# Patient Record
Sex: FEMALE | Race: WHITE | Employment: FULL TIME | URBAN - NONMETROPOLITAN AREA
[De-identification: names, ages, dates, MRNs, and addresses within clinical notes are randomized per-mention and may not be internally consistent; named-entity substitution may affect disease eponyms.]

---

## 2021-02-11 ENCOUNTER — TELEPHONE (OUTPATIENT)
Dept: SURGERY | Age: 66
End: 2021-02-11

## 2021-02-24 ENCOUNTER — OFFICE VISIT (OUTPATIENT)
Dept: SURGERY | Age: 66
End: 2021-02-24
Payer: COMMERCIAL

## 2021-02-24 VITALS
BODY MASS INDEX: 20 KG/M2 | HEIGHT: 68 IN | HEART RATE: 80 BPM | SYSTOLIC BLOOD PRESSURE: 100 MMHG | WEIGHT: 132 LBS | DIASTOLIC BLOOD PRESSURE: 68 MMHG | RESPIRATION RATE: 18 BRPM | OXYGEN SATURATION: 97 % | TEMPERATURE: 97.3 F

## 2021-02-24 DIAGNOSIS — Z01.818 PRE-OP TESTING: ICD-10-CM

## 2021-02-24 DIAGNOSIS — K82.8 BILIARY DYSKINESIA: Primary | ICD-10-CM

## 2021-02-24 PROCEDURE — 99204 OFFICE O/P NEW MOD 45 MIN: CPT | Performed by: SURGERY

## 2021-02-24 SDOH — HEALTH STABILITY: MENTAL HEALTH: HOW MANY STANDARD DRINKS CONTAINING ALCOHOL DO YOU HAVE ON A TYPICAL DAY?: NOT ASKED

## 2021-02-24 SDOH — HEALTH STABILITY: MENTAL HEALTH: HOW OFTEN DO YOU HAVE A DRINK CONTAINING ALCOHOL?: NOT ASKED

## 2021-02-25 ENCOUNTER — PREP FOR PROCEDURE (OUTPATIENT)
Dept: SURGERY | Age: 66
End: 2021-02-25

## 2021-02-25 RX ORDER — SODIUM CHLORIDE 9 MG/ML
INJECTION, SOLUTION INTRAVENOUS CONTINUOUS
Status: CANCELLED | OUTPATIENT
Start: 2021-02-25

## 2021-02-25 RX ORDER — INDOCYANINE GREEN AND WATER 25 MG
5 KIT INJECTION ONCE
Status: CANCELLED | OUTPATIENT
Start: 2021-02-25 | End: 2021-02-25

## 2021-02-25 ASSESSMENT — ENCOUNTER SYMPTOMS
TROUBLE SWALLOWING: 0
FACIAL SWELLING: 0
EYE ITCHING: 0
BACK PAIN: 0
DIARRHEA: 1
SORE THROAT: 0
CHOKING: 0
RECTAL PAIN: 0
EYE PAIN: 0
CHEST TIGHTNESS: 0
ANAL BLEEDING: 0
VOMITING: 0
VOICE CHANGE: 0
NAUSEA: 0
BLOOD IN STOOL: 0
RHINORRHEA: 0
ABDOMINAL DISTENTION: 0
WHEEZING: 0
COLOR CHANGE: 0
COUGH: 0
EYE REDNESS: 0
APNEA: 0
EYE DISCHARGE: 0
SINUS PRESSURE: 0
SHORTNESS OF BREATH: 0
STRIDOR: 0
PHOTOPHOBIA: 0
ALLERGIC/IMMUNOLOGIC NEGATIVE: 1
ABDOMINAL PAIN: 0
CONSTIPATION: 0

## 2021-02-25 NOTE — PROGRESS NOTES
Eva Gonzalez (:  1955)     ASSESSMENT:  1. Biliary dyskinesia  2. Diarrhea    PLAN:  1. Schedule Elise for laparoscopic cholecystectomy. 2. She will undergo pre-operative clearance per anesthesia guidelines with risk factors listed under the past medical history diagnosis & problem list.  3. The risks, benefits and alternatives were discussed with Thomas Ohara including non-operative management. The pros and cons of robotic, laparoscopic and open techniques were discussed. All questions answered. She understands and wishes to proceed with surgical intervention. 4. Restrictions discussed with Thomas Ohara and she expresses understanding. 5. She is advised to call back directly if there are further questions/concerns, or if her symptoms worsen prior to surgery. --Discussed with patient and  in regards to some concern as only major symptom is diarrhea. They seem to be aware and still wished to proceed with cholecystectomy. SUBJECTIVE/OBJECTIVE:    Chief Complaint   Patient presents with    Surgical Consult     New patient-referred by Dr Tra mckay-Low EF on HIDA scan     HPI  Thomas Ohara is a 82-year-old female who presents for evaluation due to biliary dyskinesia. HIDA scan demonstrated ejection fraction 23%. She has been having worsening diarrhea after food intake. Also associated with the HIDA scan. No hematochezia or melena. She has drastically decreased her diet and change food intake with marginal success. Has had some weight loss because of it. Denies much upper abdominal discomfort. No real abdominal pain. Minimal to no nausea. No emesis. No generalized abdominal pain. No chest pain or shortness of breath. No fever, chills or sweats. No change with urination. Has seen Dr. Aletha Gallardo and underwent colonoscopy without any significant findings. She states her ultrasound did not demonstrate any significant gallbladder stones.   Concerned it is still gallbladder related given HIDA scan and persistent loose stools with food intake. She is changed diet with only marginal success. Denies any significant food allergies. Review of Systems   Constitutional: Positive for unexpected weight change. Negative for activity change, appetite change, chills, diaphoresis, fatigue and fever. HENT: Negative for congestion, dental problem, drooling, ear discharge, ear pain, facial swelling, hearing loss, mouth sores, nosebleeds, postnasal drip, rhinorrhea, sinus pressure, sneezing, sore throat, tinnitus, trouble swallowing and voice change. Eyes: Negative for photophobia, pain, discharge, redness, itching and visual disturbance. Respiratory: Negative for apnea, cough, choking, chest tightness, shortness of breath, wheezing and stridor. Cardiovascular: Negative for chest pain, palpitations and leg swelling. Gastrointestinal: Positive for diarrhea. Negative for abdominal distention, abdominal pain, anal bleeding, blood in stool, constipation, nausea, rectal pain and vomiting. Endocrine: Negative. Genitourinary: Negative for decreased urine volume, difficulty urinating, dyspareunia, dysuria, enuresis, flank pain, frequency, genital sores, hematuria, menstrual problem, pelvic pain, urgency, vaginal bleeding, vaginal discharge and vaginal pain. Musculoskeletal: Negative for arthralgias, back pain, gait problem, joint swelling, myalgias, neck pain and neck stiffness. Skin: Negative for color change, pallor, rash and wound. Allergic/Immunologic: Negative. Neurological: Negative for dizziness, tremors, seizures, syncope, facial asymmetry, speech difficulty, weakness, light-headedness, numbness and headaches. Hematological: Negative for adenopathy. Does not bruise/bleed easily. Psychiatric/Behavioral: Negative for agitation, behavioral problems, confusion, decreased concentration, dysphoric mood, hallucinations, self-injury, sleep disturbance and suicidal ideas.  The patient is not nervous/anxious and is not hyperactive. History reviewed. No pertinent past medical history. Past Surgical History:   Procedure Laterality Date     SECTION      COLONOSCOPY  2020    Dr. Recio Leader       No current outpatient medications on file. No current facility-administered medications for this visit.         No Known Allergies    Family History   Problem Relation Age of Onset    Other Mother         AAA       Social History     Socioeconomic History    Marital status:      Spouse name: Not on file    Number of children: Not on file    Years of education: Not on file    Highest education level: Not on file   Occupational History    Not on file   Social Needs    Financial resource strain: Not on file    Food insecurity     Worry: Not on file     Inability: Not on file    Transportation needs     Medical: Not on file     Non-medical: Not on file   Tobacco Use    Smoking status: Never Smoker    Smokeless tobacco: Never Used   Substance and Sexual Activity    Alcohol use: Not Currently     Comment: social    Drug use: Never    Sexual activity: Not on file   Lifestyle    Physical activity     Days per week: Not on file     Minutes per session: Not on file    Stress: Not on file   Relationships    Social connections     Talks on phone: Not on file     Gets together: Not on file     Attends Episcopal service: Not on file     Active member of club or organization: Not on file     Attends meetings of clubs or organizations: Not on file     Relationship status: Not on file    Intimate partner violence     Fear of current or ex partner: Not on file     Emotionally abused: Not on file     Physically abused: Not on file     Forced sexual activity: Not on file   Other Topics Concern    Not on file   Social History Narrative    Not on file     Vitals:    21 1410   BP: 100/68   Site: Left Upper Arm   Position: Sitting   Cuff Size: Medium Adult   Pulse: 80 used to authenticate this note.     --Glendy Gill MD

## 2021-02-28 NOTE — H&P
Liset Begum (: 1955)   ASSESSMENT:   1. Biliary dyskinesia   2. Diarrhea   PLAN:   1. Schedule Elise for laparoscopic cholecystectomy. 2. She will undergo pre-operative clearance per anesthesia guidelines with risk factors listed under the past medical history diagnosis & problem list.   3. The risks, benefits and alternatives were discussed with Boni Almeida including non-operative management. The pros and cons of robotic, laparoscopic and open techniques were discussed. All questions answered. She understands and wishes to proceed with surgical intervention. 4. Restrictions discussed with Boni Almeida and she expresses understanding. 5. She is advised to call back directly if there are further questions/concerns, or if her symptoms worsen prior to surgery. --Discussed with patient and  in regards to some concern as only major symptom is diarrhea. They seem to be aware and still wished to proceed with cholecystectomy. SUBJECTIVE/OBJECTIVE:        Chief Complaint   Patient presents with    Surgical Consult     New patient-referred by Dr Derek Barcenas dyskinesia-Low EF on HIDA scan     HPI   Boni Almeida is a 68-year-old female who presents for evaluation due to biliary dyskinesia. HIDA scan demonstrated ejection fraction 23%. She has been having worsening diarrhea after food intake. Also associated with the HIDA scan. No hematochezia or melena. She has drastically decreased her diet and change food intake with marginal success. Has had some weight loss because of it. Denies much upper abdominal discomfort. No real abdominal pain. Minimal to no nausea. No emesis. No generalized abdominal pain. No chest pain or shortness of breath. No fever, chills or sweats. No change with urination. Has seen Dr. Niko Gordon and underwent colonoscopy without any significant findings. She states her ultrasound did not demonstrate any significant gallbladder stones.  Concerned it is still gallbladder related given HIDA scan and persistent loose stools with food intake. She is changed diet with only marginal success. Denies any significant food allergies. Review of Systems   Constitutional: Positive for unexpected weight change. Negative for activity change, appetite change, chills, diaphoresis, fatigue and fever. HENT: Negative for congestion, dental problem, drooling, ear discharge, ear pain, facial swelling, hearing loss, mouth sores, nosebleeds, postnasal drip, rhinorrhea, sinus pressure, sneezing, sore throat, tinnitus, trouble swallowing and voice change. Eyes: Negative for photophobia, pain, discharge, redness, itching and visual disturbance. Respiratory: Negative for apnea, cough, choking, chest tightness, shortness of breath, wheezing and stridor. Cardiovascular: Negative for chest pain, palpitations and leg swelling. Gastrointestinal: Positive for diarrhea. Negative for abdominal distention, abdominal pain, anal bleeding, blood in stool, constipation, nausea, rectal pain and vomiting. Endocrine: Negative. Genitourinary: Negative for decreased urine volume, difficulty urinating, dyspareunia, dysuria, enuresis, flank pain, frequency, genital sores, hematuria, menstrual problem, pelvic pain, urgency, vaginal bleeding, vaginal discharge and vaginal pain. Musculoskeletal: Negative for arthralgias, back pain, gait problem, joint swelling, myalgias, neck pain and neck stiffness. Skin: Negative for color change, pallor, rash and wound. Allergic/Immunologic: Negative. Neurological: Negative for dizziness, tremors, seizures, syncope, facial asymmetry, speech difficulty, weakness, light-headedness, numbness and headaches. Hematological: Negative for adenopathy. Does not bruise/bleed easily. Psychiatric/Behavioral: Negative for agitation, behavioral problems, confusion, decreased concentration, dysphoric mood, hallucinations, self-injury, sleep disturbance and suicidal ideas.  The patient is not nervous/anxious and is not hyperactive. Past Medical History   History reviewed. No pertinent past medical history. Past Surgical History         Past Surgical History:   Procedure Laterality Date     SECTION      COLONOSCOPY  2020    Dr. Malick Perdomo     Current Facility-Administered Medications   No current outpatient medications on file. No current facility-administered medications for this visit.     No Known Allergies   Family History         Family History   Problem Relation Age of Onset    Other Mother     AAA     Social History         Socioeconomic History    Marital status:      Spouse name: Not on file    Number of children: Not on file    Years of education: Not on file    Highest education level: Not on file   Occupational History    Not on file   Social Needs    Financial resource strain: Not on file    Food insecurity     Worry: Not on file     Inability: Not on file    Transportation needs     Medical: Not on file     Non-medical: Not on file   Tobacco Use    Smoking status: Never Smoker    Smokeless tobacco: Never Used   Substance and Sexual Activity    Alcohol use: Not Currently     Comment: social    Drug use: Never    Sexual activity: Not on file   Lifestyle    Physical activity     Days per week: Not on file     Minutes per session: Not on file    Stress: Not on file   Relationships    Social connections     Talks on phone: Not on file     Gets together: Not on file     Attends Scientologist service: Not on file     Active member of club or organization: Not on file     Attends meetings of clubs or organizations: Not on file     Relationship status: Not on file    Intimate partner violence     Fear of current or ex partner: Not on file     Emotionally abused: Not on file     Physically abused: Not on file     Forced sexual activity: Not on file   Other Topics Concern    Not on file   Social History Narrative    Not on file     Vitals       Vitals: 02/24/21 1410   BP: 100/68   Site: Left Upper Arm   Position: Sitting   Cuff Size: Medium Adult   Pulse: 80   Resp: 18   Temp: 97.3 °F (36.3 °C)   TempSrc: Temporal   SpO2: 97%   Weight: 132 lb (59.9 kg)   Height: 5' 8\" (1.727 m)   Body mass index is 20.07 kg/m². Wt Readings from Last 3 Encounters:   02/24/21 132 lb (59.9 kg)     Physical Exam   Vitals signs reviewed. Constitutional:   General: She is not in acute distress. Appearance: She is well-developed. She is not diaphoretic. HENT:   Head: Normocephalic and atraumatic. Right Ear: External ear normal.   Left Ear: External ear normal.   Nose: Nose normal.   Eyes:   General: No scleral icterus. Right eye: No discharge. Left eye: No discharge. Conjunctiva/sclera: Conjunctivae normal.   Neck:   Musculoskeletal: Normal range of motion and neck supple. Cardiovascular:   Rate and Rhythm: Normal rate and regular rhythm. Heart sounds: Normal heart sounds. Pulmonary:   Effort: Pulmonary effort is normal. No respiratory distress. Breath sounds: Normal breath sounds. No wheezing or rales. Chest:   Chest wall: No tenderness. Abdominal:   General: Bowel sounds are normal. There is no distension. Palpations: Abdomen is soft. There is no mass. Tenderness: There is no abdominal tenderness. There is no guarding or rebound. Musculoskeletal: Normal range of motion. General: No tenderness. Skin:   General: Skin is warm and dry. Coloration: Skin is not pale. Findings: No erythema or rash. Neurological:   Mental Status: She is alert and oriented to person, place, and time. Cranial Nerves: No cranial nerve deficit. Psychiatric:   Behavior: Behavior normal.   Thought Content: Thought content normal.   Judgment: Judgment normal.     No results found for: NA, K, CL, CO2   No results found for: CREATININE   No results found for: WBC, HGB, HCT, MCV, PLT         There is no problem list on file for this patient.      An electronic signature was used to authenticate this note.    --Belkis Lynn MD

## 2021-03-04 ENCOUNTER — ANESTHESIA EVENT (OUTPATIENT)
Dept: OPERATING ROOM | Age: 66
End: 2021-03-04
Payer: COMMERCIAL

## 2021-03-04 ENCOUNTER — HOSPITAL ENCOUNTER (OUTPATIENT)
Age: 66
Setting detail: OUTPATIENT SURGERY
Discharge: HOME | End: 2021-03-04
Attending: SURGERY | Admitting: SURGERY
Payer: COMMERCIAL

## 2021-03-04 ENCOUNTER — ANESTHESIA (OUTPATIENT)
Dept: OPERATING ROOM | Age: 66
End: 2021-03-04
Payer: COMMERCIAL

## 2021-03-04 VITALS — SYSTOLIC BLOOD PRESSURE: 106 MMHG | TEMPERATURE: 98.2 F | DIASTOLIC BLOOD PRESSURE: 51 MMHG | OXYGEN SATURATION: 97 %

## 2021-03-04 VITALS
HEART RATE: 60 BPM | RESPIRATION RATE: 16 BRPM | TEMPERATURE: 96.7 F | SYSTOLIC BLOOD PRESSURE: 102 MMHG | HEIGHT: 68 IN | DIASTOLIC BLOOD PRESSURE: 59 MMHG | WEIGHT: 127.8 LBS | OXYGEN SATURATION: 98 % | BODY MASS INDEX: 19.37 KG/M2

## 2021-03-04 DIAGNOSIS — K42.9 UMBILICAL HERNIA WITHOUT OBSTRUCTION AND WITHOUT GANGRENE: ICD-10-CM

## 2021-03-04 DIAGNOSIS — K82.8 BILIARY DYSKINESIA: Primary | ICD-10-CM

## 2021-03-04 PROCEDURE — 2500000003 HC RX 250 WO HCPCS: Performed by: NURSE ANESTHETIST, CERTIFIED REGISTERED

## 2021-03-04 PROCEDURE — 7100000001 HC PACU RECOVERY - ADDTL 15 MIN: Performed by: SURGERY

## 2021-03-04 PROCEDURE — 7100000011 HC PHASE II RECOVERY - ADDTL 15 MIN: Performed by: SURGERY

## 2021-03-04 PROCEDURE — 2580000003 HC RX 258

## 2021-03-04 PROCEDURE — 6360000002 HC RX W HCPCS

## 2021-03-04 PROCEDURE — 6370000000 HC RX 637 (ALT 250 FOR IP): Performed by: SURGERY

## 2021-03-04 PROCEDURE — 7100000000 HC PACU RECOVERY - FIRST 15 MIN: Performed by: SURGERY

## 2021-03-04 PROCEDURE — 3700000001 HC ADD 15 MINUTES (ANESTHESIA): Performed by: SURGERY

## 2021-03-04 PROCEDURE — 3700000000 HC ANESTHESIA ATTENDED CARE: Performed by: SURGERY

## 2021-03-04 PROCEDURE — 3600000002 HC SURGERY LEVEL 2 BASE: Performed by: SURGERY

## 2021-03-04 PROCEDURE — 7100000010 HC PHASE II RECOVERY - FIRST 15 MIN: Performed by: SURGERY

## 2021-03-04 PROCEDURE — 88304 TISSUE EXAM BY PATHOLOGIST: CPT

## 2021-03-04 PROCEDURE — 2500000003 HC RX 250 WO HCPCS: Performed by: SURGERY

## 2021-03-04 PROCEDURE — 2720000010 HC SURG SUPPLY STERILE: Performed by: SURGERY

## 2021-03-04 PROCEDURE — 47562 LAPAROSCOPIC CHOLECYSTECTOMY: CPT | Performed by: SURGERY

## 2021-03-04 PROCEDURE — 2500000003 HC RX 250 WO HCPCS

## 2021-03-04 PROCEDURE — 6360000002 HC RX W HCPCS: Performed by: NURSE ANESTHETIST, CERTIFIED REGISTERED

## 2021-03-04 PROCEDURE — 3600000012 HC SURGERY LEVEL 2 ADDTL 15MIN: Performed by: SURGERY

## 2021-03-04 PROCEDURE — 2709999900 HC NON-CHARGEABLE SUPPLY: Performed by: SURGERY

## 2021-03-04 RX ORDER — MORPHINE SULFATE 2 MG/ML
2 INJECTION, SOLUTION INTRAMUSCULAR; INTRAVENOUS
Status: DISCONTINUED | OUTPATIENT
Start: 2021-03-04 | End: 2021-03-04 | Stop reason: HOSPADM

## 2021-03-04 RX ORDER — FENTANYL CITRATE 50 UG/ML
50 INJECTION, SOLUTION INTRAMUSCULAR; INTRAVENOUS EVERY 5 MIN PRN
Status: DISCONTINUED | OUTPATIENT
Start: 2021-03-04 | End: 2021-03-04 | Stop reason: HOSPADM

## 2021-03-04 RX ORDER — NEOSTIGMINE METHYLSULFATE 5 MG/5 ML
SYRINGE (ML) INTRAVENOUS PRN
Status: DISCONTINUED | OUTPATIENT
Start: 2021-03-04 | End: 2021-03-04 | Stop reason: SDUPTHER

## 2021-03-04 RX ORDER — MIDAZOLAM HYDROCHLORIDE 1 MG/ML
INJECTION INTRAMUSCULAR; INTRAVENOUS PRN
Status: DISCONTINUED | OUTPATIENT
Start: 2021-03-04 | End: 2021-03-04 | Stop reason: SDUPTHER

## 2021-03-04 RX ORDER — ONDANSETRON 2 MG/ML
INJECTION INTRAMUSCULAR; INTRAVENOUS PRN
Status: DISCONTINUED | OUTPATIENT
Start: 2021-03-04 | End: 2021-03-04 | Stop reason: SDUPTHER

## 2021-03-04 RX ORDER — LIDOCAINE HYDROCHLORIDE 20 MG/ML
INJECTION, SOLUTION INFILTRATION; PERINEURAL PRN
Status: DISCONTINUED | OUTPATIENT
Start: 2021-03-04 | End: 2021-03-04 | Stop reason: SDUPTHER

## 2021-03-04 RX ORDER — PROMETHAZINE HYDROCHLORIDE 25 MG/1
12.5 TABLET ORAL EVERY 6 HOURS PRN
Status: DISCONTINUED | OUTPATIENT
Start: 2021-03-04 | End: 2021-03-04 | Stop reason: HOSPADM

## 2021-03-04 RX ORDER — BUPIVACAINE HYDROCHLORIDE 5 MG/ML
INJECTION, SOLUTION EPIDURAL; INTRACAUDAL PRN
Status: DISCONTINUED | OUTPATIENT
Start: 2021-03-04 | End: 2021-03-04 | Stop reason: ALTCHOICE

## 2021-03-04 RX ORDER — HYDROCODONE BITARTRATE AND ACETAMINOPHEN 5; 325 MG/1; MG/1
1-2 TABLET ORAL EVERY 6 HOURS PRN
Qty: 20 TABLET | Refills: 0 | Status: SHIPPED | OUTPATIENT
Start: 2021-03-04 | End: 2021-03-07

## 2021-03-04 RX ORDER — INDOCYANINE GREEN AND WATER 25 MG
5 KIT INJECTION ONCE
Status: COMPLETED | OUTPATIENT
Start: 2021-03-04 | End: 2021-03-04

## 2021-03-04 RX ORDER — OXYCODONE HYDROCHLORIDE 5 MG/1
10 TABLET ORAL EVERY 4 HOURS PRN
Status: DISCONTINUED | OUTPATIENT
Start: 2021-03-04 | End: 2021-03-04 | Stop reason: HOSPADM

## 2021-03-04 RX ORDER — SODIUM CHLORIDE 9 MG/ML
INJECTION, SOLUTION INTRAVENOUS CONTINUOUS
Status: DISCONTINUED | OUTPATIENT
Start: 2021-03-04 | End: 2021-03-04 | Stop reason: HOSPADM

## 2021-03-04 RX ORDER — GLYCOPYRROLATE 1 MG/5 ML
SYRINGE (ML) INTRAVENOUS PRN
Status: DISCONTINUED | OUTPATIENT
Start: 2021-03-04 | End: 2021-03-04 | Stop reason: SDUPTHER

## 2021-03-04 RX ORDER — LABETALOL 20 MG/4 ML (5 MG/ML) INTRAVENOUS SYRINGE
5 EVERY 10 MIN PRN
Status: DISCONTINUED | OUTPATIENT
Start: 2021-03-04 | End: 2021-03-04 | Stop reason: HOSPADM

## 2021-03-04 RX ORDER — KETOROLAC TROMETHAMINE 10 MG/1
10 TABLET, FILM COATED ORAL EVERY 8 HOURS PRN
Qty: 15 TABLET | Refills: 0 | Status: SHIPPED | OUTPATIENT
Start: 2021-03-04 | End: 2021-03-17 | Stop reason: ALTCHOICE

## 2021-03-04 RX ORDER — ROCURONIUM BROMIDE 10 MG/ML
INJECTION, SOLUTION INTRAVENOUS PRN
Status: DISCONTINUED | OUTPATIENT
Start: 2021-03-04 | End: 2021-03-04 | Stop reason: SDUPTHER

## 2021-03-04 RX ORDER — FENTANYL CITRATE 50 UG/ML
INJECTION, SOLUTION INTRAMUSCULAR; INTRAVENOUS PRN
Status: DISCONTINUED | OUTPATIENT
Start: 2021-03-04 | End: 2021-03-04 | Stop reason: SDUPTHER

## 2021-03-04 RX ORDER — ONDANSETRON 2 MG/ML
4 INJECTION INTRAMUSCULAR; INTRAVENOUS EVERY 6 HOURS PRN
Status: DISCONTINUED | OUTPATIENT
Start: 2021-03-04 | End: 2021-03-04 | Stop reason: HOSPADM

## 2021-03-04 RX ORDER — SODIUM CHLORIDE 0.9 % (FLUSH) 0.9 %
10 SYRINGE (ML) INJECTION PRN
Status: DISCONTINUED | OUTPATIENT
Start: 2021-03-04 | End: 2021-03-04 | Stop reason: HOSPADM

## 2021-03-04 RX ORDER — MORPHINE SULFATE 2 MG/ML
4 INJECTION, SOLUTION INTRAMUSCULAR; INTRAVENOUS
Status: DISCONTINUED | OUTPATIENT
Start: 2021-03-04 | End: 2021-03-04 | Stop reason: HOSPADM

## 2021-03-04 RX ORDER — OXYCODONE HYDROCHLORIDE 5 MG/1
5 TABLET ORAL EVERY 4 HOURS PRN
Status: DISCONTINUED | OUTPATIENT
Start: 2021-03-04 | End: 2021-03-04 | Stop reason: HOSPADM

## 2021-03-04 RX ORDER — MEPERIDINE HYDROCHLORIDE 25 MG/ML
12.5 INJECTION INTRAMUSCULAR; INTRAVENOUS; SUBCUTANEOUS EVERY 5 MIN PRN
Status: DISCONTINUED | OUTPATIENT
Start: 2021-03-04 | End: 2021-03-04 | Stop reason: HOSPADM

## 2021-03-04 RX ORDER — ONDANSETRON 2 MG/ML
4 INJECTION INTRAMUSCULAR; INTRAVENOUS
Status: DISCONTINUED | OUTPATIENT
Start: 2021-03-04 | End: 2021-03-04 | Stop reason: HOSPADM

## 2021-03-04 RX ORDER — DEXAMETHASONE SODIUM PHOSPHATE 4 MG/ML
INJECTION, SOLUTION INTRA-ARTICULAR; INTRALESIONAL; INTRAMUSCULAR; INTRAVENOUS; SOFT TISSUE PRN
Status: DISCONTINUED | OUTPATIENT
Start: 2021-03-04 | End: 2021-03-04 | Stop reason: SDUPTHER

## 2021-03-04 RX ORDER — SODIUM CHLORIDE 0.9 % (FLUSH) 0.9 %
10 SYRINGE (ML) INJECTION EVERY 12 HOURS SCHEDULED
Status: DISCONTINUED | OUTPATIENT
Start: 2021-03-04 | End: 2021-03-04 | Stop reason: HOSPADM

## 2021-03-04 RX ADMIN — SODIUM CHLORIDE: 9 INJECTION, SOLUTION INTRAVENOUS at 09:29

## 2021-03-04 RX ADMIN — PHENYLEPHRINE HYDROCHLORIDE 100 MCG: 10 INJECTION INTRAVENOUS at 11:05

## 2021-03-04 RX ADMIN — MIDAZOLAM HYDROCHLORIDE 1 MG: 1 INJECTION, SOLUTION INTRAMUSCULAR; INTRAVENOUS at 10:41

## 2021-03-04 RX ADMIN — LIDOCAINE HYDROCHLORIDE 80 MG: 20 INJECTION, SOLUTION INFILTRATION; PERINEURAL at 10:41

## 2021-03-04 RX ADMIN — FENTANYL CITRATE 100 MCG: 50 INJECTION, SOLUTION INTRAMUSCULAR; INTRAVENOUS at 10:41

## 2021-03-04 RX ADMIN — PHENYLEPHRINE HYDROCHLORIDE 100 MCG: 10 INJECTION INTRAVENOUS at 10:57

## 2021-03-04 RX ADMIN — ONDANSETRON HYDROCHLORIDE 4 MG: 4 INJECTION, SOLUTION INTRAMUSCULAR; INTRAVENOUS at 10:47

## 2021-03-04 RX ADMIN — FENTANYL CITRATE 50 MCG: 50 INJECTION, SOLUTION INTRAMUSCULAR; INTRAVENOUS at 11:05

## 2021-03-04 RX ADMIN — OXYCODONE 5 MG: 5 TABLET ORAL at 12:56

## 2021-03-04 RX ADMIN — CEFOXITIN 2 G: 2 INJECTION, POWDER, FOR SOLUTION INTRAVENOUS at 10:36

## 2021-03-04 RX ADMIN — PHENYLEPHRINE HYDROCHLORIDE 100 MCG: 10 INJECTION INTRAVENOUS at 11:00

## 2021-03-04 RX ADMIN — Medication 4 MG: at 11:24

## 2021-03-04 RX ADMIN — PROPOFOL 130 MG: 10 INJECTION, EMULSION INTRAVENOUS at 10:41

## 2021-03-04 RX ADMIN — Medication 5 MG: at 09:40

## 2021-03-04 RX ADMIN — ROCURONIUM BROMIDE 30 MG: 10 INJECTION INTRAVENOUS at 10:42

## 2021-03-04 RX ADMIN — DEXAMETHASONE SODIUM PHOSPHATE 10 MG: 4 INJECTION, SOLUTION INTRAMUSCULAR; INTRAVENOUS at 10:47

## 2021-03-04 RX ADMIN — MIDAZOLAM HYDROCHLORIDE 1 MG: 1 INJECTION, SOLUTION INTRAMUSCULAR; INTRAVENOUS at 10:32

## 2021-03-04 RX ADMIN — Medication 0.6 MG: at 11:24

## 2021-03-04 RX ADMIN — PHENYLEPHRINE HYDROCHLORIDE 100 MCG: 10 INJECTION INTRAVENOUS at 11:03

## 2021-03-04 ASSESSMENT — PAIN SCALES - GENERAL
PAINLEVEL_OUTOF10: 0
PAINLEVEL_OUTOF10: 0
PAINLEVEL_OUTOF10: 2
PAINLEVEL_OUTOF10: 0
PAINLEVEL_OUTOF10: 0
PAINLEVEL_OUTOF10: 3

## 2021-03-04 ASSESSMENT — PULMONARY FUNCTION TESTS
PIF_VALUE: 15
PIF_VALUE: 13
PIF_VALUE: 7
PIF_VALUE: 2
PIF_VALUE: 16
PIF_VALUE: 12
PIF_VALUE: 17
PIF_VALUE: 2
PIF_VALUE: 2
PIF_VALUE: 7
PIF_VALUE: 16
PIF_VALUE: 13
PIF_VALUE: 19
PIF_VALUE: 1
PIF_VALUE: 2
PIF_VALUE: 16
PIF_VALUE: 2
PIF_VALUE: 17
PIF_VALUE: 12
PIF_VALUE: 7
PIF_VALUE: 11
PIF_VALUE: 4
PIF_VALUE: 8
PIF_VALUE: 8
PIF_VALUE: 2
PIF_VALUE: 12
PIF_VALUE: 10
PIF_VALUE: 2
PIF_VALUE: 17
PIF_VALUE: 16
PIF_VALUE: 1
PIF_VALUE: 8

## 2021-03-04 ASSESSMENT — PAIN - FUNCTIONAL ASSESSMENT: PAIN_FUNCTIONAL_ASSESSMENT: 0-10

## 2021-03-04 NOTE — INTERVAL H&P NOTE
Update History & Physical    The patient's History and Physical was reviewed with the patient and I examined the patient. There was no change. The surgical site was confirmed by the patient and me. Plan: The risks, benefits, expected outcome, and alternative to the recommended procedure have been discussed with the patient. Patient understands and wants to proceed with the procedure. The patient was counseled at length about the risks of hamzah Covid-19 during their perioperative period and any recovery window from their procedure. The patient was made aware that hamzah Covid-19  may worsen their prognosis for recovering from their procedure  and lend to a higher morbidity and/or mortality risk. All material risks, benefits, and reasonable alternatives including postponing the procedure were discussed. The patient does wish to proceed with the procedure at this time.     Electronically signed by Amalia Morrison MD on 3/4/2021 at 10:03 AM

## 2021-03-04 NOTE — ANESTHESIA PRE PROCEDURE
Department of Anesthesiology  Preprocedure Note       Name:  Jessica Morales   Age:  63 y.o.  :  1955                                          MRN:  [id number]         Date:  3/4/2021      Surgeon: Suzette Moya):  Marvin Boateng MD    Procedure: Procedure(s):  CHOLECYSTECTOMY LAPAROSCOPIC    Medications prior to admission:   Prior to Admission medications    Not on File       Current medications:    Current Facility-Administered Medications   Medication Dose Route Frequency Provider Last Rate Last Admin    0.9 % sodium chloride infusion   Intravenous Continuous Crystal Camper, LPN        cefOXitin (MEFOXIN) 2000 mg in dextrose 5% 50 mL (mini-bag)  2,000 mg Intravenous Once Crystal Camper, LPN        indocyanine green (IC-GREEN) syringe 5 mg  5 mg Intravenous Once Crystal Camper, LPN           Allergies:  No Known Allergies    Problem List:  There is no problem list on file for this patient. Past Medical History:  History reviewed. No pertinent past medical history.     Past Surgical History:        Procedure Laterality Date     SECTION      COLONOSCOPY  2020    Dr. Devi Del Toro       Social History:    Social History     Tobacco Use    Smoking status: Never Smoker    Smokeless tobacco: Never Used   Substance Use Topics    Alcohol use: Not Currently     Comment: social                                Counseling given: Not Answered      Vital Signs (Current):   Vitals:    21 0852   BP: 114/67   Pulse: 73   Resp: 16   Temp: 97.5 °F (36.4 °C)   TempSrc: Temporal   SpO2: 99%   Weight: 127 lb 12.8 oz (58 kg)   Height: 5' 8\" (1.727 m)                                              BP Readings from Last 3 Encounters:   21 114/67   21 100/68       NPO Status: Time of last liquid consumption:                         Time of last solid consumption:                         Date of last liquid consumption: 21

## 2021-03-04 NOTE — ANESTHESIA POSTPROCEDURE EVALUATION
Post-op Pain:  Adequate analgesia    Post-op Assessment:  No apparent anesthetic complications    Additional Follow-Up / Treatment / Comment:  None    Lewis Borges.  DO Pinky  March 4, 2021   2:58 PM

## 2021-03-05 ENCOUNTER — TELEPHONE (OUTPATIENT)
Dept: SURGERY | Age: 66
End: 2021-03-05

## 2021-03-05 NOTE — TELEPHONE ENCOUNTER
S/p 3/4 1. Laparoscopic cholecystectomy. 2.  Repair of incarcerated umbilical hernia. Pt states incisions are clean, dry and intact- denies any excessive bleeding/drainage. Pt states that she is up and moving around, and using the bathroom with no issues. Pt denies any pain this morning. Advised to call the office with any questions or concerns.

## 2021-03-16 NOTE — PROGRESS NOTES
[address] [place] [address] [address] [facility] [address]  Dept: [phone]  Dept Fax: [phone]  Loc: [phone]    Visit Date: 3/17/2021    Loma Apley is a 63 y.o. female who presents today for:  Chief Complaint   Patient presents with    Post-Op Check     s/p Laparoscopic cholecystectomy,[id number]       HPI:     HPI    Ryan Hooper is a 66-year old female patient who presents today for follow-up status post laparoscopic cholecystectomy and primary repair of umbilical hernia 2 weeks ago. Pathology demonstrated chronic cholecystitis. Patient states she feels so much better. No further diarrhea since surgery. Patient is off narcotics. Still has some incisional soreness - mainly umbilical as expected. Tolerating regular diet without any nausea or vomiting. Denies any issues with constipation or diarrhea. Incisions are healing well without any signs of infection. No fevers or chills. Urinating without difficulties. Denies any shortness of breath or chest pain. Tolerating increased activity well. Past Medical History:   Diagnosis Date    Biliary dyskinesia     COVID-19     Umbilical hernia without obstruction and without gangrene       Past Surgical History:   Procedure Laterality Date     SECTION      CHOLECYSTECTOMY, LAPAROSCOPIC N/A 3/4/2021    CHOLECYSTECTOMY LAPAROSCOPIC with umbilical hernia repair performed by Belkis Lynn MD at [place] [address]  2020    Dr. Jaison Grubbs       Family History   Problem Relation Age of Onset    Other Mother         AAA       Social History     Tobacco Use    Smoking status: Never Smoker    Smokeless tobacco: Never Used   Substance Use Topics    Alcohol use: Not Currently     Comment: social      No current outpatient medications on file. No current facility-administered medications for this visit.       No Known Allergies    Subjective:     Review of Systems   Constitutional: Negative for activity change, appetite change, chills, diaphoresis, fatigue, fever and unexpected weight change. HENT: Negative for congestion, dental problem, hearing loss, rhinorrhea, sinus pressure and sore throat. Eyes: Negative for photophobia, pain, discharge, itching and visual disturbance. Respiratory: Negative for apnea, cough, choking, chest tightness, shortness of breath and wheezing. Cardiovascular: Negative for chest pain, palpitations and leg swelling. Gastrointestinal: Negative for abdominal distention, abdominal pain, anal bleeding, blood in stool, constipation, diarrhea, nausea and vomiting. Endocrine: Negative. Genitourinary: Negative for decreased urine volume, difficulty urinating, dysuria, frequency and urgency. Musculoskeletal: Negative for arthralgias, back pain, gait problem, joint swelling, myalgias and neck pain. Skin: Negative for color change, pallor, rash and wound. Allergic/Immunologic: Negative. Neurological: Negative for dizziness, tremors, weakness, numbness and headaches. Hematological: Negative. Psychiatric/Behavioral: Negative. Objective:   /60 (Site: Right Upper Arm, Position: Sitting, Cuff Size: Medium Adult)   Pulse 62   Temp 96.2 °F (35.7 °C) (Temporal)   Resp 18   Ht 5' 8\" (1.727 m)   Wt 129 lb 8 oz (58.7 kg)   SpO2 97%   BMI 19.69 kg/m²     Physical Exam  Vitals signs reviewed. Constitutional:       General: She is not in acute distress. Appearance: Normal appearance. She is well-developed. She is not ill-appearing or toxic-appearing. HENT:      Head: Normocephalic and atraumatic. Right Ear: Hearing and external ear normal.      Left Ear: Hearing and external ear normal.      Nose: Nose normal.      Mouth/Throat:      Mouth: Mucous membranes are not pale, not dry and not cyanotic.    Eyes:      General: Lids are normal.   Neck:      Musculoskeletal: Normal range of motion and neck supple. Trachea: Trachea and phonation normal.   Cardiovascular:      Rate and Rhythm: Normal rate and regular rhythm. Pulses: Normal pulses. Heart sounds: S1 normal and S2 normal.   Pulmonary:      Effort: Pulmonary effort is normal. No tachypnea, bradypnea, accessory muscle usage or respiratory distress. Breath sounds: Normal breath sounds. No decreased breath sounds, wheezing or rales. Chest:      Chest wall: No tenderness. Abdominal:      General: Bowel sounds are normal. There is no distension. Palpations: Abdomen is soft. There is no mass. Tenderness: There is no abdominal tenderness. Musculoskeletal: Normal range of motion. General: No tenderness. Skin:     General: Skin is warm and dry. Findings: No abrasion, bruising, burn, ecchymosis, erythema, laceration, lesion or rash. Neurological:      Mental Status: She is alert and oriented to person, place, and time. Motor: No tremor, atrophy or abnormal muscle tone. Coordination: Coordination normal.      Gait: Gait normal.      Deep Tendon Reflexes: Reflexes are normal and symmetric. Psychiatric:         Speech: Speech normal.         Behavior: Behavior normal.         Thought Content: Thought content normal.       PATHOLOGY REPORT   Clinical Information: BILIARY DYSKINESIA     FINAL DIAGNOSIS:   Gallbladder, cholecystectomy:    Chronic cholecystitis. Specimen:   GALLBLADDER     Gross Examination:   The container is labeled sanna Bhardwaj.  Received in   formalin is a gallbladder measuring 6.5 cm in length x 2.5 cm in   diameter.  The surface is smooth and glistening.  There is vascular   congestion.  The wall is intact.  No lymph nodes are identified.  The   lumen contains green bile.  There are no stones.  The wall measures 0.1   cm in thickness and the mucosal surface is bile-stained and velvety.    The cystic duct is patent.  Representative

## 2021-03-17 ENCOUNTER — OFFICE VISIT (OUTPATIENT)
Dept: SURGERY | Age: 66
End: 2021-03-17

## 2021-03-17 VITALS
HEART RATE: 62 BPM | RESPIRATION RATE: 18 BRPM | HEIGHT: 68 IN | BODY MASS INDEX: 19.63 KG/M2 | OXYGEN SATURATION: 97 % | DIASTOLIC BLOOD PRESSURE: 60 MMHG | TEMPERATURE: 96.2 F | WEIGHT: 129.5 LBS | SYSTOLIC BLOOD PRESSURE: 138 MMHG

## 2021-03-17 DIAGNOSIS — Z09 S/P UMBILICAL HERNIA REPAIR, FOLLOW-UP EXAM: ICD-10-CM

## 2021-03-17 DIAGNOSIS — Z90.49 S/P LAPAROSCOPIC CHOLECYSTECTOMY: ICD-10-CM

## 2021-03-17 PROCEDURE — 99024 POSTOP FOLLOW-UP VISIT: CPT | Performed by: NURSE PRACTITIONER

## 2021-03-17 ASSESSMENT — ENCOUNTER SYMPTOMS
SHORTNESS OF BREATH: 0
CHOKING: 0
SORE THROAT: 0
VOMITING: 0
EYE DISCHARGE: 0
BACK PAIN: 0
SINUS PRESSURE: 0
RHINORRHEA: 0
PHOTOPHOBIA: 0
DIARRHEA: 0
CONSTIPATION: 0
ABDOMINAL DISTENTION: 0
ALLERGIC/IMMUNOLOGIC NEGATIVE: 1
WHEEZING: 0
BLOOD IN STOOL: 0
EYE ITCHING: 0
APNEA: 0
CHEST TIGHTNESS: 0
COUGH: 0
NAUSEA: 0
EYE PAIN: 0
ABDOMINAL PAIN: 0
ANAL BLEEDING: 0
COLOR CHANGE: 0

## 2021-03-17 NOTE — LETTER
[address] [facility]  [place] [id number] [facility] [address]  Phone: [phone]  Fax: [phone]    ALESSANDRA Adams CNP        March 17, 2021     Patient: Randal Rubio   YOB: 1955   Date of Visit: 3/17/2021       To Whom It May Concern: It is my medical opinion that Samantha Odonnell may return to work without restrictions on 3/29/21. If you have any questions or concerns, please don't hesitate to call.     Sincerely,        ALESSANDRA Adams CNP Refill request Adderall and hydrocortisone  Last seen 09/27/19  Last filled 09/27/19 and 07/02/19    Allergies and pharmacy are verified.

## 2021-04-16 PROBLEM — Z09 S/P UMBILICAL HERNIA REPAIR, FOLLOW-UP EXAM: Status: RESOLVED | Noted: 2021-03-17 | Resolved: 2021-04-16

## 2022-07-15 NOTE — OP NOTE
[address] [place] [address]                                OPERATIVE REPORT    PATIENT NAME: Mariusz Motta                    :        1955  MED REC NO:   [id number]                           ROOM:  ACCOUNT NO:   [de-identified]                           ADMIT DATE: 2021  PROVIDER:     Bobby Miller M.D.    DATE OF PROCEDURE:  2021    PREOPERATIVE DIAGNOSIS:  Biliary dyskinesia. POSTOPERATIVE DIAGNOSES:  1. Biliary dyskinesia. 2.  Incarcerated umbilical hernia. PROCEDURES:  1. Laparoscopic cholecystectomy. 2.  Repair of incarcerated umbilical hernia. SURGEON:  Bobby Miller MD    ASSISTANT:  Kortney Benavides. Chakraborty, [facility]    ANESTHESIA:  General/local.    ESTIMATED BLOOD LOSS:  5 mL. DRAINS:  None. COMPLICATIONS:  None. DISPOSITION:  Stable to the recovery room. INDICATIONS:  The patient is a 68-year-old female who I saw in the  office secondary to gallbladder disease. She is found to have  symptomatic biliary dyskinesia, confirmed with HIDA scan. Both  operative and nonoperative intervention plans were discussed. Risks of  surgery were further discussed. Some of the risks included, but were  not limited to bleeding, infection, the need for reoperation, severe  chronic postoperative pain or numbness, major vascular or nerve injury,  cardiopulmonary complication, anesthetic complications, seroma/hematoma  formation, wound breakdown, trocar site herniation, bile leak, bile duct  injury, biloma formation, chronic pain, and death. After all of the  questions were answered in their entirety and the patient was completely  aware of the current situation, she wished to proceed with surgery. DESCRIPTION OF THE PROCEDURE:  After informed consent was signed and  placed on the chart, the patient was taken back to the operating room,  placed supine on the operating room table.   General anesthesia was The patient has been examined and the H&P has been reviewed:    I concur with the findings and no changes have occurred since H&P was written.        There are no hospital problems to display for this patient.     induced. She tolerated this well throughout the case. All pressure  points were padded. She was on preoperative antibiotics. Bilateral  lower extremity sequential compression devices were placed prior to  incision. Her abdomen and pelvis were prepped and draped in the usual  sterile standard fashion. A timeout occurred prior to the operation,  which not only identified the patient, but also the planned procedure to  be performed. At the end of the timeout, there were no questions or  concerns. I began the operation by making a small transverse incision  just below the umbilicus. Here was found to have an incarcerated  umbilical hernia. Hernia sac was excised in its entirety as she was  very thin, and the 11-mm trocar was placed through the defect. Some  Vaseline gauze had to be packed around this in order to get good  insufflation. Defect was about 1.5 to 2 cm. Laparoscope was inserted. Upon initial evaluation, there was no hollow viscus, solid organ, or  major vascular injury during first trocar placement. Two other 5-mm  trocars were placed in their standard location under direct vision. An  11-mm was placed in the subxiphoid position. She was placed in a  reverse Trendelenburg with the right side elevated. The gallbladder was  very distended. No acute disease, but chronic inflammatory changes were  on the infundibulum. Fundus was grasped and elevated up over the dome  of the liver. Infundibulum was then grasped, retracted down to the  right lower quadrant to better expose Calot's triangle. The  inflammatory tissues overlying the infundibulum were then incised and  eventually, the cystic artery and duct were each identified. They were  each then circumferentially dissected free until the critical view was  obtained. At that time, they were each then doubly clipped and divided  close to the gallbladder.   The gallbladder was then dissected away from the liver bed using electrocautery. It was then placed into an  endoscopic retrieval bag and then it was brought out through the  subxiphoid trocar site under direct vision. This was then sent to  Pathology for permanent. Irrigation. Irrigant return was clear. Clips  appeared to be intact. No oozing of bile or blood from the cystic duct  and artery stumps nor from the liver bed. No other abnormalities were  identified. The trocar was removed. The patient tolerated desufflation  well. Fascia closed at the subxiphoid position with 0 Vicryl suture. At the completion of this, there were no fascial defects. Skin at the 3  trocar sites was then closed in a subcuticular fashion with 4-0 Vicryl. The umbilical site was then opened up slightly further. Undersurface of  the fascia appeared to be otherwise clean. Defect was just a little bit  less than 2 cm. This was then primarily reapproximated with multiple  interrupted 0 Ethibond sutures in a figure-of-eight. This took 4 other  sutures. Irrigation. Hemostasis was adequate. Undersurface of the  umbilicus was tacked back down to the fascia with interrupted 3-0 Vicryl  suture. Deep dermal tissues were reapproximated with interrupted 3-0  Vicryl suture. Skin reapproximated with a running 4-0 Vicryl in a  subcuticular fashion. Closed incisions were then cleaned, dried, and  Steri-Strips applied. Dry sterile dressings applied. Sponge, needle,  and instrumentation counts were correct at the end of the procedure. The patient tolerated the procedure well with no apparent complications  and less than 5 mL of blood loss. The patient was easily brought out of  general anesthesia and transferred to postanesthesia care unit in stable  condition.         Dory España M.D.    D: 03/04/2021 11:50:30       T: 03/04/2021 14:42:09     [email or web address]  Job#: [id number]     Doc#: [id number]    CC:

## 2023-02-10 NOTE — BRIEF OP NOTE
Brief Postoperative Note      Patient: Patrizia Villafana  YOB: 1955  MRN: [id number]    Date of Procedure: 3/4/2021    Pre-Op Diagnosis: BILIARY DYSKINESIA    Post-Op Diagnosis: Same and Umbilical Hernia       Procedure(s):  CHOLECYSTECTOMY LAPAROSCOPIC with umbilical hernia repair    Surgeon(s):  Harris Miller MD    Assistant:  First Assistant: Trista Luna RN    Anesthesia: General    Estimated Blood Loss (mL): 5 ml    Complications: None    Specimens:   ID Type Source Tests Collected by Time Destination   A : gallbladder Tissue Gallbladder SURGICAL PATHOLOGY Harris Miller MD 3/4/2021 1115        Implants:  * No implants in log *      Drains: * No LDAs found *    Findings: as above - see op note for details    Electronically signed by Harris Miller MD on 3/4/2021 at 11:22 AM
Medications

## (undated) DEVICE — BANDAGE ADH W1XL3IN NAT FAB WVN FLX DURABLE N ADH PD SEAL

## (undated) DEVICE — SUTURE VCRL SZ 0 L18IN ABSRB VLT SUTUPAK PRECUT W/O NDL J106T

## (undated) DEVICE — GLOVE ORANGE PI 8   MSG9080

## (undated) DEVICE — TROCAR: Brand: KII FIOS FIRST ENTRY

## (undated) DEVICE — DRESSING,GAUZE,XEROFORM,CURAD,5"X9",ST: Brand: CURAD

## (undated) DEVICE — YANKAUER,BULB TIP,W/O VENT,RIGID,STERILE: Brand: MEDLINE

## (undated) DEVICE — TROCAR: Brand: KII® SLEEVE

## (undated) DEVICE — INTENDED FOR TISSUE SEPARATION, AND OTHER PROCEDURES THAT REQUIRE A SHARP SURGICAL BLADE TO PUNCTURE OR CUT.: Brand: BARD-PARKER ® CARBON RIB-BACK BLADES

## (undated) DEVICE — TUBING, SUCTION, 1/4" X 20', STRAIGHT: Brand: MEDLINE INDUSTRIES, INC.

## (undated) DEVICE — INSUFFLATION NEEDLE TO ESTABLISH PNEUMOPERITONEUM.: Brand: INSUFFLATION NEEDLE

## (undated) DEVICE — SUTURE VCRL + SZ 0 L27IN ABSRB VLT L26MM UR-6 5/8 CIR VCP603H

## (undated) DEVICE — LINER SUCT CANSTR 1500CC SEMI RIG W/ POR HYDROPHOBIC SHUT

## (undated) DEVICE — SUTURE ETHBND D SPEC NO 0 UR 6 30IN D9436

## (undated) DEVICE — BAG SPEC REM 224ML W4XL6IN DIA10MM 1 HND GYN DISP ENDOPCH

## (undated) DEVICE — SUTURE VCRL SZ 3-0 L27IN ABSRB UD FS-2 L19MM 1/2 CIR J423H

## (undated) DEVICE — GLOVE ORANGE PI 7   MSG9070

## (undated) DEVICE — GOWN,AURORA,NON-REINFORCED,2XL: Brand: MEDLINE

## (undated) DEVICE — GLOVE ORTHO 7 1/2   MSG9475

## (undated) DEVICE — POSITIONER HD W8XH4XL8.5IN RASPBERRY FOAM SLT

## (undated) DEVICE — APPLIER CLP M L L11.4IN DIA10MM ENDOSCP ROT MULT FOR LIG

## (undated) DEVICE — KIT,ANTI FOG,W/SPONGE & FLUID,SOFT PACK: Brand: MEDLINE

## (undated) DEVICE — SURGICEL ENDOSCP APPL

## (undated) DEVICE — GOWN,SIRUS,NONRNF,SETINSLV,XL,20/CS: Brand: MEDLINE

## (undated) DEVICE — TROCAR: Brand: KII SHIELDED BLADED ACCESS SYSTEM

## (undated) DEVICE — AGENT HEMSTAT 3GM OXIDIZED REGENERATED CELOS ABSRB FOR CONT (ORDER MULTIPLES OF 5EA)

## (undated) DEVICE — GOWN,SIRUS,NON REINFRCD,LARGE,SET IN SL: Brand: MEDLINE

## (undated) DEVICE — SUTURE VCRL + SZ 2-0 L27IN ABSRB WHT SH 1/2 CIR TAPERCUT VCP417H

## (undated) DEVICE — Z DUPLICATE USE 2431315 SET INSUF TBNG HI FLO W/ SMK EVAC FOR PNEUMOCLEAR

## (undated) DEVICE — GENERAL LAPAROSCOPY PACK-LF: Brand: MEDLINE INDUSTRIES, INC.

## (undated) DEVICE — COVER ARMBRD W13XL28.5IN IMPERV BLU FOR OP RM

## (undated) DEVICE — ELECTRODE PT RET AD L9FT HI MOIST COND ADH HYDRGEL CORDED

## (undated) DEVICE — GLOVE ORANGE PI 7 1/2   MSG9075

## (undated) DEVICE — PUMP SUC IRR TBNG L10FT W/ HNDPC ASSEMB STRYKEFLOW 2

## (undated) DEVICE — UNIVERSAL MONOPOLAR LAPAROSCOPIC CABLE 10FT, 4MM PIN CONNECTOR: Brand: CONMED

## (undated) DEVICE — BINDER ABD SM M W12IN CIRC 30 45IN 4 PNL E CNTCT CLSR POSTOP

## (undated) DEVICE — SOLUTION ANTIFOG VIS SYS CLEARIFY LAPSCP

## (undated) DEVICE — Z DUP USE 2217550 KIT ENDSCPC CLNNG BOWL PURE PRMM WATER BASIN ENZMTC SPNGE DT